# Patient Record
Sex: MALE | URBAN - METROPOLITAN AREA
[De-identification: names, ages, dates, MRNs, and addresses within clinical notes are randomized per-mention and may not be internally consistent; named-entity substitution may affect disease eponyms.]

---

## 2021-04-19 ENCOUNTER — OFFICE VISIT (OUTPATIENT)
Dept: URBAN - METROPOLITAN AREA CLINIC 32 | Facility: CLINIC | Age: 46
End: 2021-04-19

## 2021-04-19 DIAGNOSIS — H52.4 PRESBYOPIA: Primary | ICD-10-CM

## 2021-04-19 PROCEDURE — 92310 CONTACT LENS FITTING OU: CPT | Performed by: OPTOMETRIST

## 2021-04-19 ASSESSMENT — VISUAL ACUITY
OD: 20/25
OS: 20/20

## 2021-04-19 ASSESSMENT — INTRAOCULAR PRESSURE
OD: 15
OS: 16

## 2021-04-19 ASSESSMENT — KERATOMETRY
OS: 40.75
OD: 40.75

## 2023-03-09 ENCOUNTER — OFFICE VISIT (OUTPATIENT)
Dept: URBAN - METROPOLITAN AREA CLINIC 32 | Facility: CLINIC | Age: 48
End: 2023-03-09
Payer: COMMERCIAL

## 2023-03-09 DIAGNOSIS — H52.4 PRESBYOPIA: ICD-10-CM

## 2023-03-09 DIAGNOSIS — H10.31 UNSPECIFIED ACUTE CONJUNCTIVITIS, RIGHT EYE: Primary | ICD-10-CM

## 2023-03-09 PROCEDURE — 92310 CONTACT LENS FITTING OU: CPT

## 2023-03-09 RX ORDER — TOBRAMYCIN AND DEXAMETHASONE 1; 3 MG/ML; MG/ML
SUSPENSION/ DROPS OPHTHALMIC
Qty: 5 | Refills: 0 | Status: ACTIVE
Start: 2023-03-09

## 2023-03-09 ASSESSMENT — INTRAOCULAR PRESSURE
OS: 12
OD: 13

## 2023-03-09 NOTE — IMPRESSION/PLAN
Impression: Unspecified acute conjunctivitis, right eye: H10.31. Plan: Pt educated on findings. Sent e-Rx for tobramycin-dexamethasone BID x 1 week, then QD x 1 week OD only. Advised pt to wash bedsheets and towels and avoid touching the eye. RTC with worsening symptoms.